# Patient Record
Sex: MALE | Race: OTHER | Employment: FULL TIME | URBAN - METROPOLITAN AREA
[De-identification: names, ages, dates, MRNs, and addresses within clinical notes are randomized per-mention and may not be internally consistent; named-entity substitution may affect disease eponyms.]

---

## 2019-03-11 ENCOUNTER — HOSPITAL ENCOUNTER (OUTPATIENT)
Dept: PHYSICAL THERAPY | Age: 39
Discharge: HOME | End: 2019-03-11
Payer: OTHER GOVERNMENT

## 2019-03-11 PROCEDURE — 97161 PT EVAL LOW COMPLEX 20 MIN: CPT | Performed by: PHYSICAL THERAPIST

## 2019-03-11 PROCEDURE — 97110 THERAPEUTIC EXERCISES: CPT | Performed by: PHYSICAL THERAPIST

## 2019-03-11 NOTE — PROGRESS NOTES
PT  EVAL AND TREATMENT    Patient Name: Darryle Pummel  Date:3/11/2019  : 1980  [x]  Patient  Verified  Payor:  / Plan: Odilia Marie / Product Type:  /    In time:845am  Out time:925  Total Treatment Time (min): 40  Total Timed Codes (min): 40  1:1 Treatment Time ( [address] only): na   Visit #: 1     Treatment Area: Right shoulder pain [M25.511]  Pain in: 1  Hx: Pt reports in  he had a labral repair (bankart repair) due to 2 prior dislocations, and states he had PT for rehab following. Pt had recurrent pain following sx and then he dislocated it again skiing on 19, in which he went to ER to have shoulder reduced back in place. States he wore a sling for a week following, until he saw the orthopedic sx. Pt current c/o instability with OH lifting or reaching, reaching behind back, tucking in shirt. Pt is R hand dominant. Denies numbness/tingling in L UE.      Objective evaluation:  Physical Therapy Evaluation - Shoulder    Posture: [] Poor    [x] Fair    [] Good    Describe:    ROM:  [] Unable to assess at this time                                           AROM                                                              PROM   Left Right  Left Right   Flexion 130deg full Flexion 140    Extension 55 full Extension     Scaption/ABD 96deg  Scaption/    ER @ 0 Degrees 50deg 75deg ER @ 0 Degrees     ER @ 90 Degrees   ER @ 90 Degrees 77    IR @ 90 Degrees [id number] quality T6 IR @ 90 Degrees         Strength:   [] Unable to assess at this time                                                                            L (1-5) R (1-5) Pain   Flexors 4+ 5 [x] Yes   [] No   Abductors 5- 5 [] Yes   [] No   External Rotators 4+ 5 [x] Yes   [] No   Internal Rotators 5 5 [] Yes   [] No   Supraspinatus   [] Yes   [] No   Serratus Anterior   [] Yes   [] No   Lower Trapezius unable 4- [] Yes   [] No   Elbow Flexion 5 5 [] Yes   [] No   Elbow Extension 5 5 [] Yes   [] No Scapulohumoral Control / Rhythm:  Able to eccentrically lower with good control?  Left: [x] Yes   [] No     Right: [x] Yes   [] No    Accessory Motions: decreased post glide    Palpation  [x] Min  [] Mod  [] Severe    Location:Biceps tendon, AC jt  [] Min  [] Mod  [] Severe    Location:  [] Min  [] Mod  [] Severe    Location:       Other Tests / Comments:     Justification for Eval Code Complexity:  Patient History : past Labral repair  Examination see exam as above   Clinical Presentation: stable  Clinical Decision Making : FOTO : 30 /100    Modality (rationale): 10min CP to L shoulder     Patient Education: [x] Established HEP    [x] POT (minutes) : 15 min HEP    Pain Level (0-10 scale) post treatment: 1  ASSESSMENT  [x]  See Plan of Care    PLAN  [x]  Upgrade activities as tolerated     [x] Other:_ POC  2x/wk for 4 weeks    Shelby Venegas, PT 3/11/2019  6:45 AM

## 2019-03-11 NOTE — PROGRESS NOTES
[address] [facility] AT [facility]  [address] [place] [address],[address], [address]ing, Guerita Obando  Phone [phone]  Fax [phone] / [address] [facility] PH[facility]AP[facility]  Patient Name: Jose Antonio Perez : 1980   Medical   Diagnosis: Right shoulder pain [M25.511] Treatment Diagnosis: L shoulder    Onset Date: 19     Referral Source: Ge Joseph MD [facility]): 3/11/2019   Prior Hospitalization: See medical history Provider #: [id number]   Prior Level of Function: Indep   Comorbidities: Alcohol    Medications: Verified on Patient Summary List   The Plan of Care and following information is based on the information from the initial evaluation.   =================================================================================  Assessment / key information:  Patient is a 41 y.o. male who presents to [facility] [place] with Dx of L shoulder recurrent dislocation. Pt reports in  he had a labral repair (bankart repair) due to 2 prior dislocations, and states he had PT for rehab following. Pt had recurrent pain following sx and then he dislocated it again skiing on 19, in which he went to ER to have shoulder reduced back in place. States he wore a sling for a week following, until he saw the orthopedic sx. Pt current c/o instability with OH lifting or reaching, reaching behind back, tucking in shirt. Pt is R hand dominant. Denies numbness/tingling in L UE. Upon objective evaluation patient demonstrates decreased  A/PROM of L shoulder. Posture of FHRS. Increased accessory motion anteriorly, tightness in post jt capsule. Strength decreased slightly in flexion and ER with pain upon resistance. Decreased scapular stability noted in LT and mid traps. Patient scored 30 on FOTO indicating decreased functional activity level and QOL.  A home exercise program was demonstrated and provided to address the above objective and functional deficits. Patient can benefit from PT interventions to improve AROM and strength/stability, decrease pain and instability, to facilitate ADLs & overall functional status. AROM                                                              PROM    Left Right   Left Right   Flexion 130deg full Flexion 140     Extension 55 full Extension       Scaption/ABD 96deg   Scaption/     ER @ 0 Degrees 50deg 75deg ER @ 0 Degrees       ER @ 90 Degrees     ER @ 90 Degrees 77     IR @ 90 Degrees [id number] quality T6 IR @ 90 Degrees        =================================================================================  Eval Complexity: History: MEDIUM  Complexity : 1-2 comorbidities / personal factors will impact the outcome/ POC Exam:MEDIUM Complexity : 3 Standardized tests and measures addressing body structure, function, activity limitation and / or participation in recreation  Presentation: LOW Complexity : Stable, uncomplicated  Clinical Decision Making:MEDIUM Complexity : FOTO score of 26-74Overall Complexity:LOW   Problem List: pain affecting function, decrease ROM, decrease strength, decrease ADL/ functional abilitiies, decrease activity tolerance and decrease flexibility/ joint mobility   Treatment Plan may include any combination of the following: Therapeutic exercise, Therapeutic activities, Neuromuscular re-education, Physical agent/modality, Gait/balance training, Manual therapy, Aquatic therapy, Patient education, Self Care training and Functional mobility training  Patient / Family readiness to learn indicated by: asking questions, trying to perform skills and interest  Persons(s) to be included in education: patient (P)  Barriers to Learning/Limitations: None  Measures taken:    Patient Goal (s): Increase stability   Patient self reported health status: good  Rehabilitation Potential: good   Short Term Goals: To be accomplished in  2  weeks:  1) Establish HEP to prevent further disability.  Long Term Goals: To be accomplished in  8-12  treatments:  1) Patient to be independent & compliant with progressive HEP in preparation for D/C.  2) Improve overall strength of L shoulder and scapular mm  to 5/5 so strength is available for return to moderate lifting activities and recreational activities. 3) Patient to report 75% improvement in overall function/stability in preparation for return to recreational activities with manageable sx in R.  4.) Patient to have 150deg of L shoulder flexion/scaption to facilitate OH reaching and lifting with pain no greater than 1/10. Frequency / Duration:   Patient to be seen  2  times per week for 4  weeks:  Patient / Caregiver education and instruction: exercises  G-Codes (GP): micah  Therapist Signature: Barbra Ace PT Date: 6/86/4320   Certification Period: na Time: 6:45 AM   =======================================================================================  I certify that the above Physical Therapy Services are being furnished while the patient is under my care. I agree with the treatment plan and certify that this therapy is necessary. Physician Signature:        Date:       Time:     Please sign and return to [facility] or you may fax the signed copy to [phone]. Thank you.

## 2019-03-14 ENCOUNTER — HOSPITAL ENCOUNTER (OUTPATIENT)
Dept: PHYSICAL THERAPY | Age: 39
Discharge: HOME | End: 2019-03-14
Payer: OTHER GOVERNMENT

## 2019-03-14 PROCEDURE — 97016 VASOPNEUMATIC DEVICE THERAPY: CPT

## 2019-03-14 PROCEDURE — 97110 THERAPEUTIC EXERCISES: CPT

## 2019-03-14 NOTE — PROGRESS NOTES
PT DAILY TREATMENT NOTE     Patient Name: Radha Magaña  Date:3/14/2019  : 1980  [x]  Patient  Verified  Payor: ANIBAL / Plan: Max Bello 74 / Product Type:  /    In time: 84  Out time: 1139  Total Treatment Time (min): 64  Visit #: 2 of     Treatment Area: Right shoulder pain [M25.511]    SUBJECTIVE  Pain Level IN: (0-10 scale): 0/10  Pain Level OUT: (0-10 scale) post treatment: 0/10    Any medication changes, allergies to medications, adverse drug reactions, diagnosis change, or new procedure performed?: [x] No    [] Yes (see summary sheet for update)  Subjective functional status/changes:   [] No changes reported  Pt reports discomfort with certain movements but no pain or discomfort at rest.      OBJECTIVE    Modalities Rationale:     decrease pain to improve patient's ability to tolerate UE ADLs and fitness activities. min [] Estim, type/location:                                      []  att     []  unatt     []  w/US     []  w/ice    []  w/heat    min []  Mechanical Traction: type/lbs                   []  pro   []  sup   []  int   []  cont    []  before manual    []  after manual    min []  Ultrasound, settings/location:      min []  Iontophoresis w/ dexamethasone, location:                                               []  take home patch       []  in clinic    min []  Ice     []  Heat    location/position:    15 min [x]  Vasopneumatic Device, press/temp: L shoulder, low temp, med pressure    min []  Other:    [x] Skin assessment post-treatment (if applicable):    [x]  intact    []  redness- no adverse reaction     []redness  adverse reaction:             49 min Therapeutic Exercise:  [x] See flow sheet : first follow up visit since initial evaluation - initiated POC per flow sheet    Rationale: increase ROM and increase strength to improve the patients ability to perform UE ADLs and fitness/recreation activities.         With   [x] TE   [] TA   [] neuro   [] other: Patient Education: [x] Review HEP - provided bands for HEP   [] Progressed/Changed HEP based on:   [] positioning   [] body mechanics   [] transfers   [] heat/ice application    [] Graston Education: Explained the effects and benefits of Graston Technique therapy including potential for post treatment soreness and bruising. [] Other:           Objective/Functional Measures with Therapist Assessment Noted with Response to Therapy Session:  first follow up visit since initial evaluation -       initiated POC per flow sheet      ASSESSMENT/Changes in Function: Pt tolerated therex well. Pt was able to achieve SA contraction for SA wall slides. Continue to progress PREs per pt tolerance. Patient will continue to benefit from skilled PT services to modify and progress therapeutic interventions, address functional mobility deficits, address ROM deficits, address strength deficits, analyze and address soft tissue restrictions, analyze and cue movement patterns, analyze and modify body mechanics/ergonomics and assess and modify postural abnormalities to attain remaining goals. [x]  See Plan of Care  []  See progress note/recertification  []  See Discharge Summary         Progress towards goals / Updated goals: · Short Term Goals: To be accomplished in  2  weeks:  1) Establish HEP to prevent further disability. 3/14/19 - provided bands for HEP     · Long Term Goals: To be accomplished in  8-12  treatments:  1) Patient to be independent & compliant with progressive HEP in preparation for D/C.  2) Improve overall strength of L shoulder and scapular mm  to 5/5 so strength is available for return to moderate lifting activities and recreational activities.   3) Patient to report 75% improvement in overall function/stability in preparation for return to recreational activities with manageable sx in R.  4.) Patient to have 150deg of L shoulder flexion/scaption to facilitate OH reaching and lifting with pain no greater than 1/10.         PLAN  [x]  Upgrade activities as tolerated     [x]  Continue plan of care  []  Update interventions per flow sheet       []  Discharge due to:_  []  Other:_      Jassi Lino, PT, DPT   3/14/2019  1139 AM    Future Appointments   Date Time Provider [address] [place]   3/18/2019  7:45 AM Colette Rivera PT [facility]   3/21/2019  9:15 AM BRIANNA BostonT [facility]   3/25/2019  7:45 AM Colette Rivera PT [facility]   3/28/2019 12:00 PM [facility] PT [facility]   4/1/2019  7:45 AM Colette Rivera PT [facility]   4/4/2019  7:45 AM Que Conley, DPT [facility]

## 2019-03-18 ENCOUNTER — HOSPITAL ENCOUNTER (OUTPATIENT)
Dept: PHYSICAL THERAPY | Age: 39
Discharge: HOME | End: 2019-03-18
Payer: OTHER GOVERNMENT

## 2019-03-18 PROCEDURE — 97110 THERAPEUTIC EXERCISES: CPT | Performed by: PHYSICAL THERAPIST

## 2019-03-18 PROCEDURE — 97016 VASOPNEUMATIC DEVICE THERAPY: CPT | Performed by: PHYSICAL THERAPIST

## 2019-03-18 NOTE — PROGRESS NOTES
PT DAILY TREATMENT NOTE     Patient Name: Jose Antonio Perez  Date:3/18/2019  : 1980  [x]  Patient  Verified  Payor: ANIBAL / Plan: Max Bello 74 / Product Type:  /    In time: 914  Out time: 850am  Total Treatment Time (min): 65  Visit #: 3 of     Treatment Area: Right shoulder pain [M25.511]    SUBJECTIVE  Pain Level IN: (0-10 scale): 0-1 sore/10  Pain Level OUT: (0-10 scale) post treatment: 0/10    Any medication changes, allergies to medications, adverse drug reactions, diagnosis change, or new procedure performed?: [x] No    [] Yes (see summary sheet for update)  Subjective functional status/changes:   [] No changes reported  I get some zingers every now and then right under bone. (points to SA space). OBJECTIVE    Modalities Rationale:     decrease pain to improve patient's ability to tolerate UE ADLs and fitness activities. min [] Estim, type/location:                                      []  att     []  unatt     []  w/US     []  w/ice    []  w/heat    min []  Mechanical Traction: type/lbs                   []  pro   []  sup   []  int   []  cont    []  before manual    []  after manual    min []  Ultrasound, settings/location:      min []  Iontophoresis w/ dexamethasone, location:                                               []  take home patch       []  in clinic    min []  Ice     []  Heat    location/position:    15 min [x]  Vasopneumatic Device, press/temp: L shoulder, low temp, med pressure    min []  Other:    [x] Skin assessment post-treatment (if applicable):    [x]  intact    []  redness- no adverse reaction     []redness  adverse reaction:             50 min Therapeutic Exercise:  [x] See flow sheet : added rows/ext. Rationale: increase ROM and increase strength to improve the patients ability to perform UE ADLs and fitness/recreation activities.         With   [x] TE   [] TA   [] neuro   [] other: Patient Education: [x] Review HEP - provided bands for HEP   [] Progressed/Changed HEP based on:   [] positioning   [] body mechanics   [] transfers   [] heat/ice application    [] Graston Education: Explained the effects and benefits of Graston Technique therapy including potential for post treatment soreness and bruising. [] Other:           Objective/Functional Measures with Therapist Assessment Noted with Response to Therapy Session:  TC needed for decreased UT substitution with newly added  Rows/extensions today. Decreased endurance with SA wall slides with( RTB and sliding on ulnar border up wall )  Added quadriped SA lifts with mod fatigue of SA  for progression of scap strength and eventually progression to planks. ASSESSMENT/Changes in Function:   Pt tolerated all therex well but presents with weakness and decreased endurance in SA mm, and ER mm. Patient will continue to benefit from skilled PT services to modify and progress therapeutic interventions, address functional mobility deficits, address ROM deficits, address strength deficits, analyze and address soft tissue restrictions, analyze and cue movement patterns, analyze and modify body mechanics/ergonomics and assess and modify postural abnormalities to attain remaining goals. [x]  See Plan of Care  []  See progress note/recertification  []  See Discharge Summary         Progress towards goals / Updated goals: · Short Term Goals: To be accomplished in  2  weeks:  1) Establish HEP to prevent further disability. 3/14/19 - provided bands for HEP     · Long Term Goals: To be accomplished in  8-12  treatments:  1) Patient to be independent & compliant with progressive HEP in preparation for D/C.  2) Improve overall strength of L shoulder and scapular mm  to 5/5 so strength is available for return to moderate lifting activities and recreational activities.   3) Patient to report 75% improvement in overall function/stability in preparation for return to recreational activities with manageable sx in R.  4.) Patient to have 150deg of L shoulder flexion/scaption to facilitate OH reaching and lifting with pain no greater than 1/10.       PLAN  [x]  Upgrade activities as tolerated     [x]  Continue plan of care  []  Update interventions per flow sheet       []  Discharge due to:_  []  Other:_      Lillian Sneed, PT 3/18/2019  1139 AM    Future Appointments   Date Time Provider [place]   3/18/2019  7:45 AM Oliver Saleh, PT [facility]   3/21/2019  9:15 AM Arlin Henderson, DPT [facility]   3/25/2019  7:45 AM Oliver Saleh PT [facility]   3/28/2019 12:00 PM [facility]   4/1/2019  7:45 AM Oliver Saleh PT [facility]   4/4/2019  7:45 AM Epi Burger, DPT [facility]

## 2019-03-21 ENCOUNTER — APPOINTMENT (OUTPATIENT)
Dept: PHYSICAL THERAPY | Age: 39
End: 2019-03-21
Payer: OTHER GOVERNMENT

## 2019-03-25 ENCOUNTER — APPOINTMENT (OUTPATIENT)
Dept: PHYSICAL THERAPY | Age: 39
End: 2019-03-25
Payer: OTHER GOVERNMENT

## 2019-03-28 ENCOUNTER — APPOINTMENT (OUTPATIENT)
Dept: PHYSICAL THERAPY | Age: 39
End: 2019-03-28
Payer: OTHER GOVERNMENT

## 2019-04-01 ENCOUNTER — APPOINTMENT (OUTPATIENT)
Dept: PHYSICAL THERAPY | Age: 39
End: 2019-04-01

## 2019-04-04 ENCOUNTER — APPOINTMENT (OUTPATIENT)
Dept: PHYSICAL THERAPY | Age: 39
End: 2019-04-04

## 2019-05-02 NOTE — PROGRESS NOTES
[address] [facility] [address]  Phone [phone]  Fax [phone] DISCHARGE SUMMARY Patient Name: Deric Barrett : 1980 Treatment/Medical Diagnosis: Right shoulder pain [M25.511] Referral Source: Edouard Jeong MD    
Date of Initial Visit: 3/11/19 Attended Visits: 3 Missed Visits: 0 Summary of Care: Thank you for referring this pleasant patient. Ness Ferraro has completed 3 of 8-12 proposed sessions Patient did not return to physical therapy after 3rd visit - reason unknown Will discharge patient at this time and if patient contact office after today will advise patient that is any additional follow up or recommendation is needed to return to referring physician. Reason for Discharge: 
[x] The patient was non-compliant with attendance. [x] The patient could not be contacted to schedule further therapy sessions. [] The patient has been discharged based on the orders of the referring physician. 
[] The patient has requested to be discharged due to:  
[] Patient has met all goals or max benefit has been achieved If you have any questions/comments please contact us directly at [phone]. Thank you for allowing us to assist in the care of your patient. Therapist Signature: Otoniel Pérez PTA Date: 19   Time: 9:23 AM